# Patient Record
Sex: MALE | Race: BLACK OR AFRICAN AMERICAN | NOT HISPANIC OR LATINO | Employment: UNEMPLOYED | URBAN - METROPOLITAN AREA
[De-identification: names, ages, dates, MRNs, and addresses within clinical notes are randomized per-mention and may not be internally consistent; named-entity substitution may affect disease eponyms.]

---

## 2020-02-27 PROBLEM — K21.9 GASTROESOPHAGEAL REFLUX DISEASE: Status: ACTIVE | Noted: 2020-02-27

## 2020-02-27 PROBLEM — Z23 NEED FOR SHINGLES VACCINE: Status: ACTIVE | Noted: 2020-02-27

## 2020-02-27 PROBLEM — R73.02 IGT (IMPAIRED GLUCOSE TOLERANCE): Status: ACTIVE | Noted: 2020-02-27

## 2020-02-27 PROBLEM — N40.0 BENIGN PROSTATIC HYPERPLASIA: Status: ACTIVE | Noted: 2020-02-27

## 2020-02-27 PROBLEM — I10 ESSENTIAL HYPERTENSION: Status: ACTIVE | Noted: 2020-02-27

## 2020-02-27 PROBLEM — E78.5 HYPERLIPIDEMIA: Status: ACTIVE | Noted: 2020-02-27

## 2020-06-24 PROBLEM — R07.9 CHEST PAIN: Status: ACTIVE | Noted: 2020-06-24

## 2020-06-24 PROBLEM — R47.81 SLURRED SPEECH: Status: ACTIVE | Noted: 2020-06-24

## 2020-06-29 PROBLEM — E53.8 VITAMIN B12 DEFICIENCY: Status: ACTIVE | Noted: 2020-06-29

## 2020-07-03 PROBLEM — I20.0 UNSTABLE ANGINA: Status: ACTIVE | Noted: 2020-07-03

## 2020-09-28 PROBLEM — I95.9 HYPOTENSION: Status: ACTIVE | Noted: 2020-09-28

## 2020-10-30 PROBLEM — G20.A1 PARKINSONS: Status: ACTIVE | Noted: 2020-10-30

## 2020-11-09 PROBLEM — I20.0 UNSTABLE ANGINA: Status: RESOLVED | Noted: 2020-07-03 | Resolved: 2020-11-09

## 2020-11-09 PROBLEM — R06.09 DOE (DYSPNEA ON EXERTION): Status: ACTIVE | Noted: 2020-11-09

## 2020-11-09 PROBLEM — I10 ESSENTIAL HYPERTENSION: Status: RESOLVED | Noted: 2020-02-27 | Resolved: 2020-11-09

## 2020-11-09 PROBLEM — R07.9 CHEST PAIN: Status: RESOLVED | Noted: 2020-06-24 | Resolved: 2020-11-09

## 2020-11-28 PROBLEM — N17.9 ACUTE KIDNEY INJURY SUPERIMPOSED ON CHRONIC KIDNEY DISEASE: Status: ACTIVE | Noted: 2020-11-28

## 2020-11-28 PROBLEM — R79.89 POSITIVE D DIMER: Status: ACTIVE | Noted: 2020-11-28

## 2020-11-28 PROBLEM — R55 SYNCOPE: Status: ACTIVE | Noted: 2020-11-28

## 2020-11-28 PROBLEM — N18.9 ACUTE KIDNEY INJURY SUPERIMPOSED ON CHRONIC KIDNEY DISEASE: Status: ACTIVE | Noted: 2020-11-28

## 2020-11-28 PROBLEM — R79.89 ELEVATED LACTIC ACID LEVEL: Status: ACTIVE | Noted: 2020-11-28

## 2020-11-29 PROBLEM — N17.9 ACUTE KIDNEY INJURY SUPERIMPOSED ON CHRONIC KIDNEY DISEASE: Status: RESOLVED | Noted: 2020-11-28 | Resolved: 2020-11-29

## 2020-11-29 PROBLEM — N18.9 ACUTE KIDNEY INJURY SUPERIMPOSED ON CHRONIC KIDNEY DISEASE: Status: RESOLVED | Noted: 2020-11-28 | Resolved: 2020-11-29

## 2020-11-30 PROBLEM — R79.89 ELEVATED LACTIC ACID LEVEL: Status: RESOLVED | Noted: 2020-11-28 | Resolved: 2020-11-30

## 2020-12-07 PROBLEM — J96.01 ACUTE HYPOXEMIC RESPIRATORY FAILURE: Status: ACTIVE | Noted: 2020-12-07

## 2020-12-07 PROBLEM — U07.1 PNEUMONIA DUE TO COVID-19 VIRUS: Status: ACTIVE | Noted: 2020-12-07

## 2020-12-07 PROBLEM — J12.82 PNEUMONIA DUE TO COVID-19 VIRUS: Status: ACTIVE | Noted: 2020-12-07

## 2020-12-07 PROBLEM — R79.89 ELEVATED TROPONIN: Status: ACTIVE | Noted: 2020-12-07

## 2020-12-07 PROBLEM — E87.6 HYPOKALEMIA: Status: ACTIVE | Noted: 2020-12-07

## 2020-12-07 PROBLEM — G20.C PARKINSONISM: Status: ACTIVE | Noted: 2020-10-30

## 2020-12-12 PROBLEM — E87.6 HYPOKALEMIA: Status: RESOLVED | Noted: 2020-12-07 | Resolved: 2020-12-12

## 2020-12-21 PROBLEM — R79.89 ELEVATED TROPONIN: Status: RESOLVED | Noted: 2020-12-07 | Resolved: 2020-12-21

## 2020-12-27 ENCOUNTER — NURSE TRIAGE (OUTPATIENT)
Dept: ADMINISTRATIVE | Facility: CLINIC | Age: 60
End: 2020-12-27

## 2020-12-27 NOTE — TELEPHONE ENCOUNTER
Covid Surveillance enrollment - All surveillance task removed pt admitted to [facility] and rehab - pt is not a candidate for this program as he is already under 24 hour monitoring with admit to facility.     Reason for Disposition   Caller has cancelled the call before the first contact    Protocols used: NO CONTACT OR DUPLICATE CONTACT CALL-A-AH